# Patient Record
Sex: MALE | Race: WHITE | Employment: FULL TIME | URBAN - METROPOLITAN AREA
[De-identification: names, ages, dates, MRNs, and addresses within clinical notes are randomized per-mention and may not be internally consistent; named-entity substitution may affect disease eponyms.]

---

## 2018-01-18 PROCEDURE — 36415 COLL VENOUS BLD VENIPUNCTURE: CPT | Performed by: INTERNAL MEDICINE

## 2018-01-18 PROCEDURE — 87798 DETECT AGENT NOS DNA AMP: CPT | Performed by: INTERNAL MEDICINE

## 2018-01-18 PROCEDURE — 87502 INFLUENZA DNA AMP PROBE: CPT | Performed by: INTERNAL MEDICINE

## 2025-01-24 ENCOUNTER — OFFICE VISIT (OUTPATIENT)
Dept: FAMILY MEDICINE CLINIC | Facility: CLINIC | Age: 55
End: 2025-01-24
Payer: COMMERCIAL

## 2025-01-24 VITALS
TEMPERATURE: 98 F | HEART RATE: 70 BPM | OXYGEN SATURATION: 96 % | BODY MASS INDEX: 25.77 KG/M2 | SYSTOLIC BLOOD PRESSURE: 143 MMHG | RESPIRATION RATE: 18 BRPM | WEIGHT: 180 LBS | DIASTOLIC BLOOD PRESSURE: 79 MMHG | HEIGHT: 70 IN

## 2025-01-24 DIAGNOSIS — J01.00 ACUTE NON-RECURRENT MAXILLARY SINUSITIS: Primary | ICD-10-CM

## 2025-01-24 RX ORDER — DOXYCYCLINE 100 MG/1
100 CAPSULE ORAL 2 TIMES DAILY
Qty: 20 CAPSULE | Refills: 0 | Status: SHIPPED | OUTPATIENT
Start: 2025-01-24 | End: 2025-02-03

## 2025-01-25 NOTE — PATIENT INSTRUCTIONS
Take antibiotics with food and plenty of water.   Eat yogurt or take probiotic daily. (Probiotic-10 by [employer] is a good example of an OTC probiotic)  Make sure to finish the entire antibiotic treatment.  Increase fluids and rest.   Use OTC meds for comfort as needed--  Ibuprofen/Tylenol for fever/pain  Use Benadryl at bedtime to reduce drainage and promote rest.  Zyrtec/Claritin/Allegra in the AM to reduce nasal drainage without sedation.   Use saline nasal sprays to reduce congestion and thin secretions.   Use Delsym for cough.   Consider applying rylan's vapo-rub or eucayptus oil to chest and feet at bedtime to reduce chest and nasal congestion.   Warm tea with honey, cough lozenges, vaporizers/steam etc.    Monitor symptoms and contact the office if no better in 2-3 days.

## 2025-01-25 NOTE — PROGRESS NOTES
CHIEF COMPLAINT:     Chief Complaint   Patient presents with    Sore Throat     Headache, cough, congestion - Entered by patient  Started last Saturday, worsening symptoms were mild until yesterday. Has been on and off. Has used an inhaler and has taken flonase. Has helped but wears off after a while, takes it in the morning and night started it on Tuesday. General soreness.        HPI:   Julio Durán is a 54 year old male who presents for sinus congestion for  and drainage. Pt had sore throat that started 1 week ago. Sx remained mild until yesterday. Pt reports headache, cough, congestion.Has treated symptoms with flonase.  Denies fever, dental pain, tinnitus, N/V/D.      Pt has hx of persistent sinusitis in the past.   Current Outpatient Medications   Medication Sig Dispense Refill    doxycycline 100 MG Oral Cap Take 1 capsule (100 mg total) by mouth 2 (two) times daily for 10 days. 20 capsule 0    betamethasone dipropionate 0.05 % External Cream Apply 1 Application topically 2 (two) times daily. 45 g 5    polyethylene glycol, PEG 3350-KCl-NaBcb-NaCl-NaSulf, (GOLYTELY) 236 g Oral Recon Soln Take as directed from the office instructions 1 each 0    tamsulosin (FLOMAX) cap Take 1 capsule (0.4 mg total) by mouth daily. 30 capsule 2    Probiotic Product (VISBIOME HIGH POTENCY) Oral Cap Take 1 capsule by mouth daily. 60 capsule 0    Hydrocortisone (ANUSOL-HC) 2.5 % External Cream Bid to area 28 g 2    betamethasone dipropionate 0.05 % External Cream Apply 1 Application topically 2 (two) times daily. 45 g 3      Past Medical History:    Allergic rhinitis      Past Surgical History:   Procedure Laterality Date    Colonoscopy N/A 5/1/2015    Procedure: COLONOSCOPY;  Surgeon: Mati Espana MD;  Location:  ENDOSCOPY    Other Right 2000/2002    ORIF to right great toe    Tonsillectomy        Family History   Problem Relation Age of Onset    Ear Problems Neg     Bleeding Disorders Neg     Clotting Disorder Neg      Anesthesia Problems  Neg       Social History     Socioeconomic History    Marital status:     Number of children: 3   Occupational History    Occupation:      Employer: [employer]   Tobacco Use    Smoking status: Never    Smokeless tobacco: Never   Vaping Use    Vaping status: Never Used   Substance and Sexual Activity    Alcohol use: Yes     Comment: 1-2 drinks on weekends    Drug use: Never   Social History Narrative    ** Merged History Encounter **              REVIEW OF SYSTEMS:   GENERAL: feels well otherwise, no unplanned weight change,  Normal appetite  SKIN: no rashes or abnormal skin lesions  HEENT: See HPI.    LUNGS: denies shortness of breath or wheezing, See HPI  CARDIOVASCULAR: denies chest pain or palpitations   GI: denies N/V/C or abdominal pain  NEURO: + sinus headaches.  No numbness or tingling in face.    EXAM:   /79 (BP Location: Right arm, Patient Position: Sitting)   Pulse 70   Temp 97.8 °F (36.6 °C) (Temporal)   Resp 18   Ht 5' 10\" (1.778 m)   Wt 180 lb (81.6 kg)   SpO2 96%   BMI 25.83 kg/m²   GENERAL: well developed, well nourished,in no apparent distress  SKIN: no rashes,no suspicious lesions  HEAD: atraumatic, normocephalic, mild tenderness on palpation of maxillary sinuses  EYES: conjunctiva clear, EOM intact  EARS: TM's pearly, no bulging, no retraction, no fluid, bony landmarks present  NOSE: nostrils patent, clear nasal mucous, nasal mucosa reddened and boggy  THROAT: oral mucosa pink, moist. No visible dental caries. Posterior pharynx is not erythematous. no exudates.  NECK: supple, non-tender  LUNGS: clear to auscultation bilaterally, no wheezes or rhonchi. Breathing is non labored.  CARDIO: RRR without murmur  EXTREMITIES: no cyanosis, clubbing or edema  LYMPH:  mildly enlarged submandibular lymphadenopathy.    NEURO:  No focal deficits      ASSESSMENT AND PLAN:     Encounter Diagnosis   Name Primary?    Acute non-recurrent maxillary  sinusitis Yes       No orders of the defined types were placed in this encounter.      Meds & Refills for this Visit:  Requested Prescriptions     Signed Prescriptions Disp Refills    doxycycline 100 MG Oral Cap 20 capsule 0     Sig: Take 1 capsule (100 mg total) by mouth 2 (two) times daily for 10 days.           Risks, benefits, side effects of medication addressed and explained.    Patient Instructions   Take antibiotics with food and plenty of water.   Eat yogurt or take probiotic daily. (Probiotic-10 by [employer] is a good example of an OTC probiotic)  Make sure to finish the entire antibiotic treatment.  Increase fluids and rest.   Use OTC meds for comfort as needed--  Ibuprofen/Tylenol for fever/pain  Use Benadryl at bedtime to reduce drainage and promote rest.  Zyrtec/Claritin/Allegra in the AM to reduce nasal drainage without sedation.   Use saline nasal sprays to reduce congestion and thin secretions.   Use Delsym for cough.   Consider applying rylan's vapo-rub or eucayptus oil to chest and feet at bedtime to reduce chest and nasal congestion.   Warm tea with honey, cough lozenges, vaporizers/steam etc.    Monitor symptoms and contact the office if no better in 2-3 days.      The patient indicates understanding of these issues and agrees to the plan.

## 2025-04-11 ENCOUNTER — OFFICE VISIT (OUTPATIENT)
Dept: FAMILY MEDICINE CLINIC | Facility: CLINIC | Age: 55
End: 2025-04-11
Payer: COMMERCIAL

## 2025-04-11 VITALS
WEIGHT: 179 LBS | SYSTOLIC BLOOD PRESSURE: 138 MMHG | DIASTOLIC BLOOD PRESSURE: 84 MMHG | RESPIRATION RATE: 16 BRPM | OXYGEN SATURATION: 98 % | TEMPERATURE: 97 F | HEIGHT: 70 IN | BODY MASS INDEX: 25.62 KG/M2 | HEART RATE: 70 BPM

## 2025-04-11 DIAGNOSIS — L25.9 CONTACT DERMATITIS, UNSPECIFIED CONTACT DERMATITIS TYPE, UNSPECIFIED TRIGGER: Primary | ICD-10-CM

## 2025-04-11 PROCEDURE — 3079F DIAST BP 80-89 MM HG: CPT | Performed by: PHYSICIAN ASSISTANT

## 2025-04-11 PROCEDURE — 3075F SYST BP GE 130 - 139MM HG: CPT | Performed by: PHYSICIAN ASSISTANT

## 2025-04-11 PROCEDURE — 99213 OFFICE O/P EST LOW 20 MIN: CPT | Performed by: PHYSICIAN ASSISTANT

## 2025-04-11 PROCEDURE — 3008F BODY MASS INDEX DOCD: CPT | Performed by: PHYSICIAN ASSISTANT

## 2025-04-11 RX ORDER — DESONIDE 0.5 MG/G
CREAM TOPICAL
Qty: 15 G | Refills: 0 | Status: SHIPPED | OUTPATIENT
Start: 2025-04-11

## 2025-04-11 NOTE — PROGRESS NOTES
CHIEF COMPLAINT:     Chief Complaint   Patient presents with    Rash     3-4 days, rash around eyes, redness, itchy  OTC none          HPI:    Julio Durán is a 54 year old male who presents for evaluation of a rash.  Per patient rash started in the past 3-4 days. Red, slightly raised, pruritic, burning lesions to glen lower periorbital regions. Initially suspected sunburn.  Recently visited [place], stayed with friends and used their hygiene products, sunscreen.   Tried neosporin without relief and sx seemed to worsen   Now avoiding any products in area.   Denies vision changes, no fever, no chills/sweats, nor other complaints.     Concerned about possible shingles. Prior h/o unilateral lesions which occurred at different times on neck, forehead, and leg with somewhat similar sx burning/itching.   Never bilateral     Current Medications[1]   Past Medical History[2]   Past Surgical History[3]   Family History[4]   Short Social Hx on File[5]      REVIEW OF SYSTEMS:   GENERAL: feels well otherwise  SKIN: Per HPI.   HEENT: Denies rhinorrhea, edema of the lips or swelling of throat.  CARDIOVASCULAR: Denies chest pains or palpitations.  LUNGS: Denies shortness of breath with exertion or rest. No cough or wheezing.  LYMPH: Denies enlargement of the lymph nodes.        EXAM:   /84   Pulse 70   Temp 97.4 °F (36.3 °C)   Resp 16   Ht 5' 10\" (1.778 m)   Wt 179 lb (81.2 kg)   SpO2 98%   BMI 25.68 kg/m²   GENERAL: well developed, well nourished,in no apparent distress  SKIN: (+) poorly defined erythematous mildly raised macular eruption glen infraorbital/cheek area, dry appearing, mildly chapped at borders, without involvement of eyelids, no pustules/vesicles, no induration or lymphangitic streaking.    No other facial lesions involved.   EYES: PERRLA, EOMI, conjunctiva are clear, no ptosis/proptosis, lids are clear.   HENT: Head atraumatic, normocephalic. Glen ext ears/nares clear.   LUNGS: Normal effort, no  conversational dyspnea.   LYMPH: No pre/post auricular or ant cervical LAD.     ASSESSMENT AND PLAN:   Julio Durán is a 54 year old male who presents for evaluation of a rash. Findings are consistent with:    ASSESSMENT:  Encounter Diagnosis   Name Primary?    Contact dermatitis, unspecified contact dermatitis type, unspecified trigger Yes       PLAN:    Desonide per epic, use and s/e reviewed including risk cataracts and atrophy with prolonged use.  Discussed avoidance of potential triggers, sun protection, and f/u with dermatology if symptoms fail to improve/resolve as anticipated.      Meds as listed below.  Comfort measures as described in Patient Instructions.  Skin care discussed with patient.     Meds & Refills for this Visit:  Requested Prescriptions     Signed Prescriptions Disp Refills    desonide 0.05 % External Cream 15 g 0     Sig: Apply to affected areas twice daily as needed for up to 7 days.       Risk and benefits of medication discussed.     The patient indicates understanding of these issues and agrees to the plan.  The patient is asked to see PCP in 3 days if sx's are not improving or if they worsen.    Patient Instructions   Desonide 0.05% cream: apply to affected areas of face twice daily for up to 7 days.     Use noncomedogenic, fragrance and dye free products on face.     Avoid sun exposure.     Follow up with dermatology if symptoms fail to improve/resolve as anticipated.     Go to the Immediate Care or Emergency Department in event of new or worsening symptoms at any time       Radha Sheldon PA-C           [1]   Current Outpatient Medications   Medication Sig Dispense Refill    desonide 0.05 % External Cream Apply to affected areas twice daily as needed for up to 7 days. 15 g 0    betamethasone dipropionate 0.05 % External Cream Apply 1 Application topically 2 (two) times daily. 45 g 5    polyethylene glycol, PEG 3350-KCl-NaBcb-NaCl-NaSulf, (GOLYTELY) 236 g Oral Recon Soln Take as  directed from the office instructions 1 each 0    tamsulosin (FLOMAX) cap Take 1 capsule (0.4 mg total) by mouth daily. 30 capsule 2    Probiotic Product (VISBIOME HIGH POTENCY) Oral Cap Take 1 capsule by mouth daily. 60 capsule 0    Hydrocortisone (ANUSOL-HC) 2.5 % External Cream Bid to area 28 g 2    betamethasone dipropionate 0.05 % External Cream Apply 1 Application topically 2 (two) times daily. 45 g 3   [2]   Past Medical History:   Allergic rhinitis   [3]   Past Surgical History:  Procedure Laterality Date    Colonoscopy N/A 5/1/2015    Procedure: COLONOSCOPY;  Surgeon: Mati Espana MD;  Location:  ENDOSCOPY    Other Right 2000/2002    ORIF to right great toe    Tonsillectomy     [4]   Family History  Problem Relation Age of Onset    Ear Problems Neg     Bleeding Disorders Neg     Clotting Disorder Neg     Anesthesia Problems  Neg    [5]   Social History  Socioeconomic History    Marital status:     Number of children: 3   Occupational History    Occupation:      Employer: [employer]   Tobacco Use    Smoking status: Never    Smokeless tobacco: Never   Vaping Use    Vaping status: Never Used   Substance and Sexual Activity    Alcohol use: Yes     Comment: 1-2 drinks on weekends    Drug use: Never   Social History Narrative    ** Merged History Encounter **

## 2025-04-11 NOTE — PATIENT INSTRUCTIONS
Desonide 0.05% cream: apply to affected areas of face twice daily for up to 7 days.     Use noncomedogenic, fragrance and dye free products on face.     Avoid sun exposure.     Follow up with dermatology if symptoms fail to improve/resolve as anticipated.     Go to the Immediate Care or Emergency Department in event of new or worsening symptoms at any time